# Patient Record
Sex: FEMALE | URBAN - METROPOLITAN AREA
[De-identification: names, ages, dates, MRNs, and addresses within clinical notes are randomized per-mention and may not be internally consistent; named-entity substitution may affect disease eponyms.]

---

## 2019-09-06 ENCOUNTER — APPOINTMENT (RX ONLY)
Dept: URBAN - METROPOLITAN AREA CLINIC 361 | Facility: CLINIC | Age: 79
Setting detail: DERMATOLOGY
End: 2019-09-06

## 2019-09-06 DIAGNOSIS — D485 NEOPLASM OF UNCERTAIN BEHAVIOR OF SKIN: ICD-10-CM

## 2019-09-06 DIAGNOSIS — L57.0 ACTINIC KERATOSIS: ICD-10-CM

## 2019-09-06 PROBLEM — D48.5 NEOPLASM OF UNCERTAIN BEHAVIOR OF SKIN: Status: ACTIVE | Noted: 2019-09-06

## 2019-09-06 PROCEDURE — ? BIOPSY BY SHAVE METHOD

## 2019-09-06 PROCEDURE — ? LIQUID NITROGEN

## 2019-09-06 PROCEDURE — ? COUNSELING

## 2019-09-06 PROCEDURE — 17003 DESTRUCT PREMALG LES 2-14: CPT

## 2019-09-06 PROCEDURE — 17000 DESTRUCT PREMALG LESION: CPT | Mod: 59

## 2019-09-06 PROCEDURE — 11102 TANGNTL BX SKIN SINGLE LES: CPT

## 2019-09-06 PROCEDURE — ? OTHER

## 2019-09-06 ASSESSMENT — LOCATION DETAILED DESCRIPTION DERM
LOCATION DETAILED: LEFT DISTAL DORSAL FOREARM
LOCATION DETAILED: RIGHT ULNAR DORSAL HAND
LOCATION DETAILED: LEFT ANTERIOR PROXIMAL THIGH

## 2019-09-06 ASSESSMENT — LOCATION ZONE DERM
LOCATION ZONE: LEG
LOCATION ZONE: ARM
LOCATION ZONE: HAND

## 2019-09-06 ASSESSMENT — LOCATION SIMPLE DESCRIPTION DERM
LOCATION SIMPLE: RIGHT HAND
LOCATION SIMPLE: LEFT FOREARM
LOCATION SIMPLE: LEFT THIGH

## 2019-09-06 NOTE — PROCEDURE: LIQUID NITROGEN
Render Post-Care Instructions In Note?: no
Duration Of Freeze Thaw-Cycle (Seconds): 20
Detail Level: Detailed
Number Of Freeze-Thaw Cycles: 1 freeze-thaw cycle
Consent: The patient's consent was obtained including but not limited to risks of crusting, scabbing, blistering, scarring, darker or lighter pigmentary change, recurrence, incomplete removal and infection.

## 2019-09-06 NOTE — PROCEDURE: MIPS QUALITY
Quality 47: Advance Care Plan: Advance Care Planning discussed and documented; advance care plan or surrogate decision maker documented in the medical record.
Name And Contact Information For Health Care Proxy: 
Quality 226: Preventive Care And Screening: Tobacco Use: Screening And Cessation Intervention: Patient screened for tobacco use and is an ex/non-smoker
Quality 431: Preventive Care And Screening: Unhealthy Alcohol Use - Screening: Patient screened for unhealthy alcohol use using a single question and scores less than 2 times per year
Detail Level: Detailed

## 2019-09-06 NOTE — PROCEDURE: OTHER
Other (Free Text): Patient previously had ln2 with Dr. Clifton in [facility] [phone]. Other (Free Text): Patient previously had ln2 with Dr. Clifton in [facility] [phone].

## 2019-09-06 NOTE — PROCEDURE: BIOPSY BY SHAVE METHOD
Render Post-Care Instructions In Note?: no
Electrodesiccation Text: The wound bed was treated with electrodesiccation after the biopsy was performed.
Post-Care Instructions: I reviewed with the patient in detail post-care instructions. Patient is to keep the biopsy site dry overnight, and then apply aquaphor twice daily until healed. Patient may apply soap and water to clean area after 24 hours.
Depth Of Biopsy: dermis
Was A Bandage Applied: Yes
Silver Nitrate Text: The wound bed was treated with silver nitrate after the biopsy was performed.
Size Of Lesion In Cm: 0
Hemostasis: Aluminum Chloride
Anesthesia Type: 1% lidocaine with epinephrine
Type Of Destruction Used: Curettage
Biopsy Type: H and E
Electrodesiccation And Curettage Text: The wound bed was treated with electrodesiccation and curettage after the biopsy was performed.
Cryotherapy Text: The wound bed was treated with cryotherapy after the biopsy was performed.
Dressing: Band-Aid
Anesthesia Volume In Cc (Will Not Render If 0): 0.4
Biopsy Method: Dermablade
Consent: Verbal consent was obtained and risks were reviewed including but not limited to scarring, infection, bleeding, scabbing, incomplete removal, nerve damage and allergy to anesthesia.
Detail Level: Detailed
Wound Care: Aquaphor
Billing Type: Third-Party Bill
Notification Instructions: Patient will be notified of biopsy results. However, patient instructed to call the office if not contacted within 2 weeks.

## 2019-09-06 NOTE — HPI: SKIN LESION
How Severe Is Your Skin Lesion?: mild
Has Your Skin Lesion Been Treated?: not been treated
Is This A New Presentation, Or A Follow-Up?: Skin Lesion
Additional History: Patient is unsure if this has been treated in [place].

## 2019-10-17 ENCOUNTER — APPOINTMENT (RX ONLY)
Dept: URBAN - METROPOLITAN AREA CLINIC 361 | Facility: CLINIC | Age: 79
Setting detail: DERMATOLOGY
End: 2019-10-17

## 2019-10-17 PROBLEM — D04.62 CARCINOMA IN SITU OF SKIN OF LEFT UPPER LIMB, INCLUDING SHOULDER: Status: ACTIVE | Noted: 2019-10-17

## 2019-10-17 PROCEDURE — ? COUNSELING

## 2019-10-17 PROCEDURE — ? DEFER

## 2019-10-17 NOTE — PROCEDURE: MIPS QUALITY
Quality 226: Preventive Care And Screening: Tobacco Use: Screening And Cessation Intervention: Patient screened for tobacco use and is an ex/non-smoker
Detail Level: Detailed
Quality 431: Preventive Care And Screening: Unhealthy Alcohol Use - Screening: Patient screened for unhealthy alcohol use using a single question and scores less than 2 times per year
Quality 47: Advance Care Plan: Advance Care Planning discussed and documented; advance care plan or surrogate decision maker documented in the medical record.

## 2019-10-17 NOTE — PROCEDURE: DEFER
Instructions (Optional): Dr Rivero [phone] fax over pathology report or Dr Clifton [email or web address] [phone] Instructions (Optional): Dr Rivero [phone] fax over pathology report or Dr Clifton @[email or web address] [phone]

## 2020-06-23 ENCOUNTER — APPOINTMENT (RX ONLY)
Dept: URBAN - METROPOLITAN AREA CLINIC 361 | Facility: CLINIC | Age: 80
Setting detail: DERMATOLOGY
End: 2020-06-23

## 2020-06-23 DIAGNOSIS — L40.8 OTHER PSORIASIS: ICD-10-CM

## 2020-06-23 PROCEDURE — ? COUNSELING

## 2020-06-23 PROCEDURE — ? TREATMENT REGIMEN

## 2020-06-23 PROCEDURE — 99212 OFFICE O/P EST SF 10 MIN: CPT

## 2020-06-23 PROCEDURE — ? OTHER

## 2020-06-23 PROCEDURE — ? PRESCRIPTION

## 2020-06-23 RX ORDER — CLOBETASOL PROPIONATE 0.5 MG/G
OINTMENT TOPICAL
Qty: 1 | Refills: 0 | Status: ERX | COMMUNITY
Start: 2020-06-23

## 2020-06-23 RX ADMIN — CLOBETASOL PROPIONATE: 0.5 OINTMENT TOPICAL at 00:00

## 2020-06-23 ASSESSMENT — LOCATION DETAILED DESCRIPTION DERM
LOCATION DETAILED: LEFT ANTERIOR DISTAL UPPER ARM
LOCATION DETAILED: RIGHT VENTRAL PROXIMAL FOREARM
LOCATION DETAILED: RIGHT ANTERIOR DISTAL THIGH
LOCATION DETAILED: LEFT ANTERIOR DISTAL THIGH

## 2020-06-23 ASSESSMENT — LOCATION ZONE DERM
LOCATION ZONE: LEG
LOCATION ZONE: ARM

## 2020-06-23 ASSESSMENT — LOCATION SIMPLE DESCRIPTION DERM
LOCATION SIMPLE: RIGHT THIGH
LOCATION SIMPLE: RIGHT FOREARM
LOCATION SIMPLE: LEFT THIGH
LOCATION SIMPLE: LEFT UPPER ARM

## 2020-06-23 NOTE — PROCEDURE: OTHER
Note Text (......Xxx Chief Complaint.): This diagnosis correlates with the
Other (Free Text): Patient has been under a lot of stress with ill , may be causing condition exacerbation. Denies new products or medications.  Biopsy offered but patient declined
Detail Level: Zone

## 2021-05-18 ENCOUNTER — APPOINTMENT (RX ONLY)
Dept: URBAN - METROPOLITAN AREA CLINIC 361 | Facility: CLINIC | Age: 81
Setting detail: DERMATOLOGY
End: 2021-05-18

## 2021-05-18 DIAGNOSIS — L82.1 OTHER SEBORRHEIC KERATOSIS: ICD-10-CM

## 2021-05-18 DIAGNOSIS — L40.8 OTHER PSORIASIS: ICD-10-CM

## 2021-05-18 DIAGNOSIS — D69.2 OTHER NONTHROMBOCYTOPENIC PURPURA: ICD-10-CM

## 2021-05-18 PROCEDURE — ? COUNSELING

## 2021-05-18 PROCEDURE — 99213 OFFICE O/P EST LOW 20 MIN: CPT

## 2021-05-18 PROCEDURE — ? TREATMENT REGIMEN

## 2021-05-18 ASSESSMENT — LOCATION DETAILED DESCRIPTION DERM
LOCATION DETAILED: LEFT DISTAL DORSAL FOREARM
LOCATION DETAILED: RIGHT DISTAL DORSAL FOREARM
LOCATION DETAILED: RIGHT PROXIMAL DORSAL FOREARM

## 2021-05-18 ASSESSMENT — LOCATION SIMPLE DESCRIPTION DERM
LOCATION SIMPLE: LEFT FOREARM
LOCATION SIMPLE: RIGHT FOREARM

## 2021-05-18 ASSESSMENT — LOCATION ZONE DERM: LOCATION ZONE: ARM

## 2021-05-18 NOTE — PROCEDURE: TREATMENT REGIMEN
Detail Level: Zone
Otc Regimen: Eucerin advanced repair cream bid x 3 weeks\\nClinical photo taken today. Bx of scabbed papule recommended at fu visit if persists in one month in spite og treatment with moisturizer. Ddx reviewed inflammatory vs nmsc.pt declines bx today.

## 2021-06-28 ENCOUNTER — APPOINTMENT (RX ONLY)
Dept: URBAN - METROPOLITAN AREA CLINIC 361 | Facility: CLINIC | Age: 81
Setting detail: DERMATOLOGY
End: 2021-06-28

## 2021-06-28 DIAGNOSIS — D485 NEOPLASM OF UNCERTAIN BEHAVIOR OF SKIN: ICD-10-CM | Status: RESOLVING

## 2021-06-28 PROBLEM — D48.5 NEOPLASM OF UNCERTAIN BEHAVIOR OF SKIN: Status: ACTIVE | Noted: 2021-06-28

## 2021-06-28 PROCEDURE — 99212 OFFICE O/P EST SF 10 MIN: CPT

## 2021-06-28 PROCEDURE — ? TREATMENT REGIMEN

## 2021-06-28 ASSESSMENT — LOCATION ZONE DERM: LOCATION ZONE: ARM

## 2021-06-28 ASSESSMENT — LOCATION DETAILED DESCRIPTION DERM: LOCATION DETAILED: RIGHT PROXIMAL DORSAL FOREARM

## 2021-06-28 ASSESSMENT — LOCATION SIMPLE DESCRIPTION DERM: LOCATION SIMPLE: RIGHT FOREARM

## 2021-06-28 NOTE — PROCEDURE: TREATMENT REGIMEN
Plan: Resolving s/p moisturizing. Discussed ddx SCC . Encouraged pt to moisturize daily. fu if recurs
Detail Level: Detailed

## 2021-07-17 NOTE — PROCEDURE: MIPS QUALITY
Quality 226: Preventive Care And Screening: Tobacco Use: Screening And Cessation Intervention: Patient screened for tobacco use and is an ex/non-smoker
Detail Level: Detailed
Quality 431: Preventive Care And Screening: Unhealthy Alcohol Use - Screening: Patient screened for unhealthy alcohol use using a single question and scores less than 2 times per year
18

## 2022-08-16 ENCOUNTER — APPOINTMENT (RX ONLY)
Dept: URBAN - METROPOLITAN AREA CLINIC 361 | Facility: CLINIC | Age: 82
Setting detail: DERMATOLOGY
End: 2022-08-16

## 2022-08-16 DIAGNOSIS — L30.9 DERMATITIS, UNSPECIFIED: ICD-10-CM | Status: INADEQUATELY CONTROLLED

## 2022-08-16 PROCEDURE — 99214 OFFICE O/P EST MOD 30 MIN: CPT

## 2022-08-16 PROCEDURE — ? PRESCRIPTION

## 2022-08-16 PROCEDURE — ? PRESCRIPTION MEDICATION MANAGEMENT

## 2022-08-16 PROCEDURE — ? COUNSELING

## 2022-08-16 RX ORDER — TRIAMCINOLONE ACETONIDE 1 MG/G
OINTMENT TOPICAL
Qty: 80 | Refills: 0 | COMMUNITY
Start: 2022-08-16

## 2022-08-16 RX ADMIN — TRIAMCINOLONE ACETONIDE: 1 OINTMENT TOPICAL at 00:00

## 2022-08-16 ASSESSMENT — LOCATION DETAILED DESCRIPTION DERM
LOCATION DETAILED: RIGHT LATERAL ABDOMEN
LOCATION DETAILED: LEFT MEDIAL BREAST 7-8:00 REGION
LOCATION DETAILED: LEFT VENTRAL PROXIMAL FOREARM

## 2022-08-16 ASSESSMENT — LOCATION ZONE DERM
LOCATION ZONE: TRUNK
LOCATION ZONE: ARM

## 2022-08-16 ASSESSMENT — LOCATION SIMPLE DESCRIPTION DERM
LOCATION SIMPLE: LEFT BREAST
LOCATION SIMPLE: LEFT FOREARM
LOCATION SIMPLE: ABDOMEN

## 2022-08-16 NOTE — PROCEDURE: PRESCRIPTION MEDICATION MANAGEMENT
Detail Level: Detailed
Render In Strict Bullet Format?: No
Initiate Treatment: triamcinolone acetonide 0.1 % topical ointment \\nApply to aa bid x2 weeks prn flared
Samples Given: Impoyz bid x1 week PRN flared pt requested samples today